# Patient Record
Sex: FEMALE | Race: WHITE | Employment: PART TIME | URBAN - METROPOLITAN AREA
[De-identification: names, ages, dates, MRNs, and addresses within clinical notes are randomized per-mention and may not be internally consistent; named-entity substitution may affect disease eponyms.]

---

## 2019-02-28 PROBLEM — Z90.49 STATUS POST LAPAROSCOPIC CHOLECYSTECTOMY: Status: ACTIVE | Noted: 2019-02-28

## 2019-03-01 ENCOUNTER — PATIENT OUTREACH (OUTPATIENT)
Dept: OTHER | Age: 44
End: 2019-03-01

## 2019-03-04 ENCOUNTER — PATIENT OUTREACH (OUTPATIENT)
Dept: OTHER | Age: 44
End: 2019-03-04

## 2019-03-04 NOTE — PROGRESS NOTES
Transition Of Care Note Patient discharged from [address] admitted on 19 and discharged on 19 for Laparoscopic Cholecystectomy. Medical History:    
Past Medical History:  
Diagnosis Date  Anemia  Diabetes ([facility] [address] gestational only Care Manager contacted the patient by telephone to perform post hospital discharge assessment. Verified  and zip code with patient as identifiers. Provided introduction to self, and explanation of the Nurse Care Manager role. Patient's primary care provider relationship reviewed with patient and modified, as applicable. Red Flags: You are sick to your stomach and cannot drink fluids. You have pain that does not get better when you take your pain medicine. You have signs of infection, such as: Increased pain, swelling, warmth, or redness. Red streaks leading from the incision. Pus draining from the incision. Swollen lymph nodes in your neck, armpits, or groin. A fever. Your urine turns dark brown or your stool is light-colored or raquel-colored. Your skin or the whites of your eyes turn yellow. Bright red blood has soaked through a large bandage over your incision. You have signs of a blood clot, such as: 
Pain in your calf, back of knee, thigh, or groin. Redness and swelling in your leg or groin. You have trouble passing urine or stool, especially if you have mild pain or swelling in your lower belly. Watch closely for any changes in your health, and be sure to contact your doctor if: 
You had a laparoscopic surgery and your shoulder pain lasts more than 24 hours. You do not have a bowel movement after taking a laxative. Condition Focused Assessment:  
Patient reports the following: Gastrointestinal Condition Focused Assessment Skin- any open wounds, incisions or appliance yes Description of wound- Healthy, intact, glued, no drainage, lessening bruising.   
New or worsening pain? no 
New or worsening numbness or tingling? no 
 If yes, location of numbness and tingling: NA Activity level- moving several times a day, or as recommended? yes Abnormal activity level reported: None Nutrition- prescribed diet? no  
Diet prescribed or recommended: None Difficulty swallowing no In the last 24 hour have you experienced; Fever no Low body temperature no Chills or shaking no Sweating no Fast heart rate no Fast breathing no Dizziness/lightheadedness no Confusion or unusual change in mental status no Diarrhea no Nausea no Vomiting no Shortness of breath or difficulty breathing no Less urine output no Cold, clammy, and pale skin no Skin rash or skin color changes no Medication:  
New Medications at Discharge: Percocet Changed Medications at Discharge: None Discontinued Medications at Discharge: None Current Outpatient Medications Medication Sig  
 oxyCODONE-acetaminophen (PERCOCET) 5-325 mg per tablet Take 1 Tab by mouth every six (6) hours as needed for Pain for up to 5 days. Max Daily Amount: 4 Tabs.  famotidine (PEPCID) 20 mg tablet Take 20 mg by mouth daily.  multivitamin (ONE A DAY) tablet Take 1 Tab by mouth daily. No current facility-administered medications for this visit. There are no discontinued medications. Performed medication reconciliation with patient, and patient verbalizes understanding of administration of home medications. There were no barriers to obtaining medications identified at this time. Inpatient RRAT score: None Was this a readmission? no Patient stated reason for the readmission: NA Barriers/Support system: 
patient and spouse Home health/DME in place per discharge orders Barriers/Challenges to Care: []  Decline in memory    []  Language barrier    
[]  Emotional                  []  Limited mobility 
[]  Lack of motivation     [] Vision, hearing or cognitive impairment []  Knowledge [] Financial Barriers []  Lack of support  []  Pain []  Other [x]  None CM Identified  Problems 
 (contributing problems for risk for readmission) 1. Risk for infection Discharge Instructions : 
Reviewed discharge instructions with patient. Patient verbalizes understanding of discharge instructions and follow-up care. Advance Care Planning:  
Patient was offered the opportunity to discuss advance care planning:  no    
Does patient have an Advance Directive:  no If no, did you provide information on [facility]?  no  
 
PCP/Specialist follow up: Patient scheduled to follow up with Josse Scherer NP as needed. Patient has a follow up with her surgeon, Dr. Samson Fuchs on 3/12/19. Future Appointments Date Time Provider Vanessa Vasquez 3/12/2019  9:00 AM Arabella Beckham MD [facility] Reviewed red flags with patient, and patient verbalizes understanding. Patient given an opportunity to ask questions. No other clinical/social/functional needs noted. The patient agrees to contact the PCP office for questions related to their healthcare. The patient expressed thanks, offered no additional questions and ended the call.

## 2019-03-20 ENCOUNTER — PATIENT OUTREACH (OUTPATIENT)
Dept: OTHER | Age: 44
End: 2019-03-20

## 2019-03-20 NOTE — PROGRESS NOTES
Telephonic outreach attempt to follow up with patient, currently enrolled in [facility]. Left a discreet VM with a request for a return call. Will continue to attempt to outreach this patient for follow up.

## 2019-04-04 ENCOUNTER — PATIENT OUTREACH (OUTPATIENT)
Dept: OTHER | Age: 44
End: 2019-04-04

## 2019-04-04 NOTE — PROGRESS NOTES
Resolving current episode [facility](Transitions of care complete). No further ED/UC or hospital admissions within 30 days post discharge. Patient attended follow-up appointments as directed. No outreach from patient to [address] [place] [address].

## 2024-03-08 NOTE — PROGRESS NOTES
Patient on [facility] discharge report dated 2/28. Left message on voicemail. Will attempt to contact again. Need to complete post-discharge assessment. Attending Only